# Patient Record
Sex: FEMALE | Employment: UNEMPLOYED | URBAN - METROPOLITAN AREA
[De-identification: names, ages, dates, MRNs, and addresses within clinical notes are randomized per-mention and may not be internally consistent; named-entity substitution may affect disease eponyms.]

---

## 2019-04-04 ENCOUNTER — HOSPITAL ENCOUNTER (OUTPATIENT)
Dept: LAB | Age: 54
Discharge: HOME | End: 2019-04-04
Payer: MEDICARE

## 2019-04-04 ENCOUNTER — OFFICE VISIT (OUTPATIENT)
Dept: OBGYN CLINIC | Age: 54
End: 2019-04-04

## 2019-04-04 DIAGNOSIS — Z01.419 WELL WOMAN EXAM WITH ROUTINE GYNECOLOGICAL EXAM: ICD-10-CM

## 2019-04-04 DIAGNOSIS — N93.9 ABNORMAL UTERINE BLEEDING (AUB): ICD-10-CM

## 2019-04-04 DIAGNOSIS — D21.9 FIBROIDS: Primary | ICD-10-CM

## 2019-04-04 DIAGNOSIS — N84.1 CERVICAL POLYP: ICD-10-CM

## 2019-04-04 LAB
ERYTHROCYTE [DISTWIDTH] IN BLOOD BY AUTOMATED COUNT: 13 % (ref 11.6–14.5)
HCT VFR BLD AUTO: 37.8 % (ref 35–45)
HGB BLD-MCNC: 12.1 G/DL (ref 12–16)
MCH RBC QN AUTO: 28.3 PG (ref 24–34)
MCHC RBC AUTO-ENTMCNC: 32 G/DL (ref 31–37)
MCV RBC AUTO: 88.3 FL (ref 74–97)
PLATELET # BLD AUTO: 206 K/UL (ref 135–420)
PMV BLD AUTO: 11.7 FL (ref 9.2–11.8)
RBC # BLD AUTO: 4.28 M/UL (ref 4.2–5.3)
WBC # BLD AUTO: 5.2 K/UL (ref 4.6–13.2)

## 2019-04-04 PROCEDURE — 88175 CYTOPATH C/V AUTO FLUID REDO: CPT

## 2019-04-04 PROCEDURE — 88305 TISSUE EXAM BY PATHOLOGIST: CPT

## 2019-04-04 PROCEDURE — 85027 COMPLETE CBC AUTOMATED: CPT

## 2019-04-04 PROCEDURE — 87624 HPV HI-RISK TYP POOLED RSLT: CPT

## 2019-04-04 RX ORDER — FLUTICASONE FUROATE AND VILANTEROL 200; 25 UG/1; UG/1
1 POWDER RESPIRATORY (INHALATION)
COMMUNITY

## 2019-04-04 RX ORDER — NITROFURANTOIN 25; 75 MG/1; MG/1
CAPSULE ORAL
Refills: 0 | COMMUNITY
Start: 2019-02-19

## 2019-04-04 RX ORDER — AMLODIPINE BESYLATE 10 MG/1
TABLET ORAL
Refills: 0 | COMMUNITY
Start: 2019-04-01

## 2019-04-04 RX ORDER — OLANZAPINE 5 MG/1
5 TABLET ORAL
COMMUNITY
Start: 2019-03-28

## 2019-04-04 RX ORDER — FLUTICASONE FUROATE AND VILANTEROL TRIFENATATE 200; 25 UG/1; UG/1
POWDER RESPIRATORY (INHALATION)
Refills: 0 | COMMUNITY
Start: 2019-01-02

## 2019-04-04 RX ORDER — OLANZAPINE 5 MG/1
TABLET ORAL
Refills: 0 | COMMUNITY
Start: 2019-03-28

## 2019-04-04 RX ORDER — ALBUTEROL SULFATE 90 UG/1
2 AEROSOL, METERED RESPIRATORY (INHALATION)
COMMUNITY

## 2019-04-04 RX ORDER — PANTOPRAZOLE SODIUM 40 MG/1
TABLET, DELAYED RELEASE ORAL
Refills: 0 | COMMUNITY
Start: 2019-04-01

## 2019-04-04 NOTE — PROGRESS NOTES
[facility] OB GYNOFFICE PROCEDURE PROGRESS NOTE Chart reviewed for the following: 
 Monie Mendoza, have reviewed the History, Physical and updated the Allergic reactions for Alexandra Klein TIME OUT performed immediately prior to start of procedure: 
 I, Bryan Fisher, have performed the following reviews on Alexandra Klein prior to the start of the procedure: 
         
* Patient was identified by name and date of birth * Agreement on procedure being performed was verified * Risks and Benefits explained to the patient * Procedure site verified and marked as necessary * Patient was positioned for comfort * Consent was signed and verified Time: 1125 Date of procedure: 4/4/2019 Procedure performed by:  Bryan Fisher MD 
 
Provider assisted by: Senia Sawant LPN Patient assisted by: self How tolerated by patient: tolerated the procedure well with no complications Post Procedural Pain Scale: 4 - Hurts Little More Comments: none

## 2019-04-04 NOTE — PROGRESS NOTES
Carmen Germain presents today for Chief Complaint Patient presents with  Well Woman Is someone accompanying this pt? no 
Patient does not have any concerns at this time. Is the patient using any DME equipment during [address]? no 
 
Depression Screening: 
3 most recent PHQ Screens 4/4/2019 Little interest or pleasure in doing things Nearly every day Feeling down, depressed, irritable, or hopeless More than half the days Total Score PHQ 2 5 Trouble falling or staying asleep, or sleeping too much More than half the days Feeling tired or having little energy More than half the days Poor appetite, weight loss, or overeating More than half the days Feeling bad about yourself - or that you are a failure or have let yourself or your family down More than half the days Trouble concentrating on things such as school, work, reading, or watching TV More than half the days Moving or speaking so slowly that other people could have noticed; or the opposite being so fidgety that others notice More than half the days Thoughts of being better off dead, or hurting yourself in some way Not at all PHQ 9 Score 17 How difficult have these problems made it for you to do your work, take care of your home and get along with others Very difficult Learning Assessment: 
Learning Assessment 4/4/2019 PRIMARY LEARNER Patient HIGHEST LEVEL OF EDUCATION - PRIMARY LEARNER  DID NOT GRADUATE HIGH SCHOOL  
BARRIERS PRIMARY LEARNER NONE  
CO-LEARNER CAREGIVER No  
PRIMARY LANGUAGE ENGLISH  
LEARNER PREFERENCE PRIMARY LISTENING  
  DEMONSTRATION  
  READING  
ANSWERED BY Patient RELATIONSHIP SELF Abuse Screening: 
Abuse Screening Questionnaire 4/4/2019 Do you ever feel afraid of your partner? Neel Boykin Are you in a relationship with someone who physically or mentally threatens you? Neel Boykin Is it safe for you to go home? Marlene Ndiaye Fall Risk No flowsheet data found.  
 
This Visit Test 
 No results found for this or any previous visit. Health Maintenance reviewed and discussed and ordered per Provider. Health Maintenance Due Topic Date Due  
 PAP AKA CERVICAL CYTOLOGY  08/12/1988  BREAST CANCER SCRN MAMMOGRAM  08/12/2017  
 FOBT Q 1 YEAR AGE 50-75  08/12/2017 Yelena Deleon Coordination of Care: 1. Have you been to the ER, urgent care clinic since your last visit? Hospitalized since your last visit? no 
 
2. Have you seen or consulted any other health care providers outside of the [address] [place] [address] since your last visit? Include any pap smears or colon screening.  no

## 2019-04-05 VITALS
WEIGHT: 204.8 LBS | BODY MASS INDEX: 32.15 KG/M2 | RESPIRATION RATE: 18 BRPM | DIASTOLIC BLOOD PRESSURE: 88 MMHG | HEIGHT: 67 IN | OXYGEN SATURATION: 96 % | TEMPERATURE: 98 F | SYSTOLIC BLOOD PRESSURE: 149 MMHG | HEART RATE: 87 BPM

## 2019-04-05 PROBLEM — F32.1 DEPRESSION, MAJOR, SINGLE EPISODE, MODERATE (HCC): Status: ACTIVE | Noted: 2019-04-05

## 2019-04-05 PROBLEM — F33.1 DEPRESSION, MAJOR, RECURRENT, MODERATE (HCC): Status: ACTIVE | Noted: 2019-04-05

## 2019-04-05 NOTE — PROGRESS NOTES
Subjective:  
48 y.o. female for Well Woman Check. No LMP recorded. Social History: single partner, contraception - none. Pertinent past medical history: hypertension, no history of  DVT, CAD, DM, liver disease, migraines or smoking. ROS:  Feeling well. No dyspnea or chest pain on exertion. No abdominal pain, change in bowel habits, black or bloody stools. No urinary tract symptoms. GYN ROS: no breast pain or new or enlarging lumps on self exam, she complains of vaginal bleeding. No neurological complaints. Objective:  
 
Visit Vitals /88 (BP 1 Location: Left arm, BP Patient Position: Sitting) Comment: on medications but did not today. Pulse 87 Temp 98 °F (36.7 °C) (Oral) Resp 18 Ht 5' 7\" (1.702 m) Wt 204 lb 12.8 oz (92.9 kg) SpO2 96% BMI 32.08 kg/m² GENERAL:  Well developed, well nourished, in no distress NEURO/PSYCHE: Grossly intact, normal mood and affect HEENT: Normal cephalic, atraumatic, good dentition, neck supple. No thyromegaly BREASTS: breasts appear normal, no suspicious masses, but has some abrasive skin lesions, no nipple changes, no nipple discharge CV: regular rate and rhythm LUNGS: clear to auscultation bilaterally, no wheezes, rhonchi or rales, good air entry with normal effort ABDOMEN: + BS, soft without tenderness, no guarding, rebound or masses EXTREMITIES: no edema or erythema noted SKIN:  Warm, dry, no lesions LYMPHATICS: No supraclavicular, axillary or inguinal nodes noted PELVIC EXAM: 
LABIA MAJORA: no masses, tenderness or lesions LABIA MINORA: no masses, tenderness or lesions CLITORIS: no masses, tenderness or lesions URETHRA: normal appearing, no masses or tenderness BLADDER: no fullness or tenderness VAGINA: pink appearing vagina with blood in the vault, no lesions PERINEUM: no masses, tenderness or lesions CERVIX: A small protruding polyp seen in the cervical os. UTERUS: > 10 weeks uterine fibroids, mobile, and nontender ADNEXA: nontender and no masses or nodularity RECTAL--normal 
 
Assessment/Plan:  
well woman Cervical Polyp 
mammogram 
pap smear 
return annually or prn 
  ICD-10-CM ICD-9-CM 1. Fibroids D21.9 215.9 US PELV NON OB W TV  
2. Well woman exam with routine gynecological exam Z01.419 V72.31 PAP IG, APTIMA HPV AND RFX 16/18,45 ([id number]) JACQUES MAMMO BI SCREENING INCL CAD 3. Abnormal uterine bleeding (AUB) N93.9 626.9 CBC W/O DIFF 4. Cervical polyp N84.1 622.7 SURGICAL PATHOLOGY Nancy Sinclair

## 2019-04-15 ENCOUNTER — TELEPHONE (OUTPATIENT)
Dept: OBGYN CLINIC | Age: 54
End: 2019-04-15

## 2019-04-15 NOTE — TELEPHONE ENCOUNTER
Call made to the pt, unable to leave a message due to the mailbox being full. Endocervical polyp was benign. Will try back at a later time.

## 2019-04-15 NOTE — TELEPHONE ENCOUNTER
----- Message from Randall Bella MD sent at 4/10/2019 11:13 PM EDT -----  Benign endocervical polyp. Advise her please.

## 2019-04-22 NOTE — TELEPHONE ENCOUNTER
Return call made to the pt using two identifiers,name and . Pt made aware that her endocervical biopsy was benign and also that [facility] has been trying to contact her to schedule the US. Pt verbalized understanding. And the information  given to the pt for  [facility]. Pt again verbalized understanding.

## 2019-05-01 ENCOUNTER — TELEPHONE (OUTPATIENT)
Dept: OBGYN CLINIC | Age: 54
End: 2019-05-01

## 2019-05-01 NOTE — TELEPHONE ENCOUNTER
Called pt 2 pt. Identifiers confirmed. Informed pt that if the bleeding is too heavy and is of concern to go to the ER or wait for further instruction from Dr. Deidre Luna tomorrow. Pt stated she will call tomorrow for follow up instruction from provider.

## 2019-05-01 NOTE — TELEPHONE ENCOUNTER
2 pt. Identifiers confirmed. Pt complains of \"breakthrough\" heavy bleeding, accompanied with mild cramping. Pt is requesting a Rx to slow down the bleeding and pain.